# Patient Record
(demographics unavailable — no encounter records)

---

## 2025-01-14 NOTE — ASSESSMENT
[FreeTextEntry1] : periods regular with weight loss and exercise  labs nutrition return 3 months if needed

## 2025-01-14 NOTE — HISTORY OF PRESENT ILLNESS
[FreeTextEntry2] : elevated testosterone level Menarche at age 14 years 1 month old initally regular  for the first the first 3 months, length varied, then started skipped 1-2 months, lasted about 5 days, period 5/24, then not agian until 9/24, lasted 5 days, regular since 9/24, lmp 12/20-24 no acne waxing above lip once to twice per week no exposure to androgenic products  weight fluctuated over the past year August 198 pounds, started walking every day  until 1-15,000 steps, lost about 186 pounds, no change in diet.

## 2025-01-14 NOTE — PHYSICAL EXAM
[Healthy Appearing] : healthy appearing [Well Nourished] : well nourished [Interactive] : interactive [Normal Appearance] : normal appearance [Well formed] : well formed [Normally Set] : normally set [Normal S1 and S2] : normal S1 and S2 [Clear to Ausculation Bilaterally] : clear to auscultation bilaterally [Abdomen Soft] : soft [Abdomen Tenderness] : non-tender [] : no hepatosplenomegaly [5] : was Roger stage 5 [Roger Stage ___] : the Roger stage for breast development was [unfilled] [Normal] : normal  [Murmur] : no murmurs

## 2025-01-14 NOTE — PAST MEDICAL HISTORY
[At ___ Weeks Gestation] : at [unfilled] weeks gestation [Normal Vaginal Route] : by normal vaginal route [None] : there were no delivery complications [FreeTextEntry1] : 6.5 pounds, [facility]

## 2025-04-29 NOTE — ASSESSMENT
[FreeTextEntry1] : Beth is a 15 year 10 month old female with a history of elevated testosterone level and irregular periods that have now become regular with weight loss and exercise, likely component of insulin resistance and ovarian hyperandrogenism now improved  labs nutrition return 3 months if needed  cardiology nutiritoin 800 IU plus mvt no need for follow up

## 2025-04-29 NOTE — PHYSICAL EXAM
[Healthy Appearing] : healthy appearing [Well Nourished] : well nourished [Interactive] : interactive [Normal Appearance] : normal appearance [Well formed] : well formed [Normally Set] : normally set [Normal S1 and S2] : normal S1 and S2 [Murmur] : no murmurs [Clear to Ausculation Bilaterally] : clear to auscultation bilaterally [Abdomen Soft] : soft [Abdomen Tenderness] : non-tender [] : no hepatosplenomegaly [5] : was Roger stage 5 [Roger Stage ___] : the Roger stage for breast development was [unfilled] [Normal] : normal

## 2025-04-29 NOTE — REVIEW OF SYSTEMS
[Nl] : Neurological [NI] : Endocrine [Wgt Gain (___ Lbs)] : recent [unfilled] lb weight gain [Irregular Periods] : irregular periods

## 2025-04-29 NOTE — HISTORY OF PRESENT ILLNESS
[Headaches] : no headaches [Visual Symptoms] : no ~T visual symptoms [Polyuria] : no polyuria [Polydipsia] : no polydipsia [Constipation] : no constipation [Cold Intolerance] : no cold intolerance [Sweating] : no sweating [Palpitations] : no palpitations [Nervousness] : no nervousness [Fatigue] : no fatigue [Weakness] : no weakness [Abdominal Pain] : no abdominal pain [FreeTextEntry2] : Beth is a 15 year 10 month old female seen for initial consultation of elevated testosterone level Menarche at age 14 years 1 month old initially regular for the first the first 3 months, length varied, then started skipped 1-2 months, lasted about 5 days, period 5/24, then not agian until 9/24, lasted 5 days, regular since 9/24, lmp 12/20-24 no acne waxing above lip once to twice per week no exposure to androgenic products  weight fluctuated over the past year August 198 pounds, started walking every day  until 1-15,000 steps, lost about 10 pounds, no change in diet.   4/29/25: Well since last visit. Saw nutritionist, cut back on fast food. Still walking 15.000 steps per day. LMP yesterday, this month 3 weeks late, otherwise regular. Presyncopal episode at school, 1st period, ate breakfast. Friend said she looked pale and lips looked purple, occurred a few weeks ago, no subsequent episode.

## 2025-05-21 NOTE — PLAN
[FreeTextEntry1] : Plan - Recommend an MRI to assess potential increased intracranial pressure.  - Advise increasing water intake to help manage symptoms of dizziness and potential orthostatic hypotension. - Schedule an appointment with an ophthalmologist to evaluate for any signs of papilledema or other eye-related issues. - Follow up with a cardiologist to rule out any cardiac causes for the syncopal episodes and ensure cardiovascular health. - Plan a follow-up appointment in three months to reassess symptoms and review any new findings from the MRI and specialist consultations.

## 2025-05-21 NOTE — PHYSICAL EXAM
[Well-appearing] : well-appearing [Normocephalic] : normocephalic [No dysmorphic facial features] : no dysmorphic facial features [No ocular abnormalities] : no ocular abnormalities [Neck supple] : neck supple [Alert] : alert [Well related, good eye contact] : well related, good eye contact [Conversant] : conversant [Normal speech and language] : normal speech and language [Follows instructions well] : follows instructions well [VFF] : VFF [Pupils reactive to light and accommodation] : pupils reactive to light and accommodation [Full extraocular movements] : full extraocular movements [No nystagmus] : no nystagmus [Normal facial sensation to light touch] : normal facial sensation to light touch [No facial asymmetry or weakness] : no facial asymmetry or weakness [Gross hearing intact] : gross hearing intact [Equal palate elevation] : equal palate elevation [Good shoulder shrug] : good shoulder shrug [Normal tongue movement] : normal tongue movement [Midline tongue, no fasciculations] : midline tongue, no fasciculations [R handed] : R handed [Normal axial and appendicular muscle tone] : normal axial and appendicular muscle tone [Gets up on table without difficulty] : gets up on table without difficulty [No pronator drift] : no pronator drift [Normal finger tapping and fine finger movements] : normal finger tapping and fine finger movements [No abnormal involuntary movements] : no abnormal involuntary movements [5/5 strength in proximal and distal muscles of arms and legs] : 5/5 strength in proximal and distal muscles of arms and legs [Walks and runs well] : walks and runs well [Able to do deep knee bend] : able to do deep knee bend [Able to walk on heels] : able to walk on heels [Able to walk on toes] : able to walk on toes [2+ biceps] : 2+ biceps [Triceps] : triceps [Knee jerks] : knee jerks [Ankle jerks] : ankle jerks [No ankle clonus] : no ankle clonus [Bilaterally] : bilaterally [Localizes LT and temperature] : localizes LT and temperature [No dysmetria on FTNT] : no dysmetria on FTNT [Good walking balance] : good walking balance [Normal gait] : normal gait [Able to tandem well] : able to tandem well [Negative Romberg] : negative Romberg [de-identified] : Able to visualize fundi on the right no papilledema, unable to visualize left.

## 2025-05-21 NOTE — ASSESSMENT
[FreeTextEntry1] : 15 yo female with presyncopal episodes  Assessment - Vasovagal syncope likely related to changes in blood pressure. - No evidence of seizures. - Not consistent with migraines. - Potential concern for increased intracranial pressure due to high BMI, though not confirmed.

## 2025-05-21 NOTE — CONSULT LETTER
[Dear  ___] : Dear  [unfilled], [Consult Letter:] : I had the pleasure of evaluating your patient, [unfilled]. [Please see my note below.] : Please see my note below. [Consult Closing:] : Thank you very much for allowing me to participate in the care of this patient.  If you have any questions, please do not hesitate to contact me. [Sincerely,] : Sincerely, [FreeTextEntry3] : Dana Parikh MD Medical Director, Pediatric Concussion Program  , [employer] [facility] Department of Pediatric Neurology [facility]  [address], [address] Tel: [phone] Fax: [phone]

## 2025-05-21 NOTE — HISTORY OF PRESENT ILLNESS
[FreeTextEntry1] : Reason for Visit: Headaches and syncopal episodes.   History of present illness Beth Lozoya, 16 years old   Syncopal Episodes and Headaches   Beth has experienced four episodes where she felt like she was about to faint, and her vision went black. The first episode occurred during a health class on a Friday, where she was sitting down and felt her heart racing, her body shaking, and her vision going black. Her friend noticed she was pale with purple lips and helped her by having her put her head down for about 10 minutes, after which she felt better, although she remained dizzy for most of the day. The second episode occurred while she was in a science class, where she experienced similar symptoms, but the episode was shorter. The third episode happened while shopping at [employer] with her mother. She felt her heart racing while standing in line, and her vision started to go black. She drank a soda, which did not immediately help, and her brother took her to the car to lie down, which eventually made her feel better. The fourth episode occurred after eating lunch, where she felt dizzy, her vision went black, and her friend noticed she was turning pale. In none of these episodes did she fall to the ground, bite her tongue, experience incontinence, or foam at the mouth. She sometimes feels dizzy when standing up too quickly, which lasts about 10 minutes, and occasionally experiences her heart racing, accompanied by shaking hands. She sometimes experiences headaches when dizzy, but not consistently when her vision goes black. Lights and noises sometimes bother her during these episodes.   Lifestyle and Habits   Beth eats breakfast, lunch, and dinner every day, although she mentioned that during the first episode, she had not eaten lunch. She drinks a minimum of eight glasses of water daily, and her water bottle is described as "probably big." Beth reports sleeping well.   Asthma   Beth has a history of asthma. During her first syncopal episode, she initially thought her symptoms were related to her asthma, as she felt her heart racing and her body felt heavy. She sometimes experiences her heart racing, which causes her hands to shake. Her asthma is well-controlled.   Irregular Menstruation   Beth has a history of irregular menstruation. No further details were provided regarding this condition.   Obesity   Beth has a BMI above the ninety-fifth percentile, indicating obesity. She has been seen by endocrinology and nutrition in the past for evaluation and management of her weight.   Elizabeth Campos has a history of asthma and obesity. She has been seen by endocrinology and nutrition in the past. She sometimes experiences headaches when she feels dizzy, but not consistently when her vision goes black. She reports that lights and noises can be bothersome during these episodes.   Past medical history - Asthma - Irregular menstruation - Obesity (BMI above the 95th percentile)   Family history - Family history of cysts in the head - Family history of hypertension   Social history - Does not smoke - Does not drink alcohol - Beth is doing well in school.   Current medications - Motrin, as needed - Tylenol, as needed - Multivitamin, unspecified dosage

## 2025-06-27 NOTE — REVIEW OF SYSTEMS
[Dizziness] : dizziness [Feeling Poorly] : not feeling poorly (malaise) [Fever] : no fever [Wgt Loss (___ Lbs)] : no recent weight loss [Pallor] : not pale [Eye Discharge] : no eye discharge [Redness] : no redness [Change in Vision] : no change in vision [Nasal Stuffiness] : no nasal congestion [Sore Throat] : no sore throat [Earache] : no earache [Loss Of Hearing] : no hearing loss [Cyanosis] : no cyanosis [Edema] : no edema [Diaphoresis] : not diaphoretic [Chest Pain] : no chest pain or discomfort [Exercise Intolerance] : no persistence of exercise intolerance [Palpitations] : no palpitations [Orthopnea] : no orthopnea [Fast HR] : no tachycardia [Tachypnea] : not tachypneic [Wheezing] : no wheezing [Cough] : no cough [Shortness Of Breath] : not expressed as feeling short of breath [Vomiting] : no vomiting [Diarrhea] : no diarrhea [Abdominal Pain] : no abdominal pain [Decrease In Appetite] : appetite not decreased [Fainting (Syncope)] : no fainting [Seizure] : no seizures [Headache] : no headache [Limping] : no limping [Joint Pains] : no arthralgias [Joint Swelling] : no joint swelling [Rash] : no rash [Wound problems] : no wound problems [Easy Bruising] : no tendency for easy bruising [Swollen Glands] : no lymphadenopathy [Nosebleeds] : no epistaxis [Easy Bleeding] : no ~M tendency for easy bleeding [Sleep Disturbances] : ~T no sleep disturbances [Hyperactive] : no hyperactive behavior [Depression] : no depression [Anxiety] : no anxiety [Failure To Thrive] : no failure to thrive [Short Stature] : short stature was not noted [Jitteriness] : no jitteriness [Heat/Cold Intolerance] : no temperature intolerance [Dec Urine Output] : no oliguria

## 2025-06-27 NOTE — CARDIOLOGY SUMMARY
[Today's Date] : [unfilled] [FreeTextEntry1] : For Near syncope Normal sinus rhythm, normal QRS axis, normal intervals (QTc 448msec), no hypertrophy, no pre-excitation, no ST segment or T wave abnormalities. Normal EKG for age.   [FreeTextEntry2] : Normal intracardiac anatomy.  LV dimensions and shortening fraction were normal.  No pericardial effusion.  [de-identified] : placed

## 2025-06-27 NOTE — HISTORY OF PRESENT ILLNESS
[FreeTextEntry1] : LISSETT is a 16-year-old female who was referred for cardiology consultation due to near syncope. There have been many near syncopal episodes since April 2025. This episode occurred when she was standing.  She had not been exercising around the time of the event. She had eaten breakfast that day and was reportedly well-hydrated. She has occasional orthostatic dizziness. She denies chest pain, palpitations, shortness of breath, diaphoresis, or nausea. There has been no recent change in activity level, no fatigue, and no difficulty gaining weight or weight loss. She is active in treadmill 6 days for total 2 hrs./day and has had no recent decrease in exercise endurance. She generally has fair fluid intake and does not drink excessive caffeinated beverages. Importantly, there is no family history of recurrent syncope, premature sudden death, cardiomyopathy, arrhythmia, drowning, or unexplained accidental deaths.

## 2025-06-27 NOTE — PHYSICAL EXAM
[General Appearance - Alert] : alert [General Appearance - In No Acute Distress] : in no acute distress [General Appearance - Well Nourished] : well nourished [General Appearance - Well Developed] : well developed [General Appearance - Well-Appearing] : well appearing [Appearance Of Head] : the head was normocephalic [Facies] : there were no dysmorphic facial features [Sclera] : the conjunctiva were normal [Outer Ear] : the ears and nose were normal in appearance [Examination Of The Oral Cavity] : mucous membranes were moist and pink [Auscultation Breath Sounds / Voice Sounds] : breath sounds clear to auscultation bilaterally [Normal Chest Appearance] : the chest was normal in appearance [Apical Impulse] : quiet precordium with normal apical impulse [Heart Rate And Rhythm] : normal heart rate and rhythm [Heart Sounds] : normal S1 and S2 [No Murmur] : no murmurs  [Heart Sounds Gallop] : no gallops [Heart Sounds Pericardial Friction Rub] : no pericardial rub [Edema] : no edema [Arterial Pulses] : normal upper and lower extremity pulses with no pulse delay [Capillary Refill Test] : normal capillary refill [Heart Sounds Click] : no clicks [Bowel Sounds] : normal bowel sounds [Abdomen Soft] : soft [Nondistended] : nondistended [Abdomen Tenderness] : non-tender [Nail Clubbing] : no clubbing  or cyanosis of the fingers [Motor Tone] : normal muscle strength and tone [Cervical Lymph Nodes Enlarged Anterior] : The anterior cervical nodes were normal [Cervical Lymph Nodes Enlarged Posterior] : The posterior cervical nodes were normal [] : no rash [Skin Lesions] : no lesions [Skin Turgor] : normal turgor [Demonstrated Behavior - Infant Nonreactive To Parents] : interactive [Mood] : mood and affect were appropriate for age [Demonstrated Behavior] : normal behavior

## 2025-06-27 NOTE — DISCUSSION/SUMMARY
[FreeTextEntry1] : In summary, LISSETT is a 16 year female with multiple syncope. LISSETT had one syncopal episode which was likely vasovagal in origin. It was provoked by upright posture and inadequate fluid intake. I discussed at length with the family that these symptoms are not likely related to cardiac pathology.   We discussed the need to maintain adequate hydration, drinking at least 8-10 cups of water per day, and avoiding caffeinated beverages. Her fluid intake should be titrated to keep his urine dilute. We discussed eating an adequate, healthy breakfast in the morning, and lunch at school. We discussed standing up slowly from a lying or seated position to avoid these episodes, as well as recognizing the warning signs (lightheadedness, nausea, visual change) and lying down with elevated legs when they occur. If necessary, increasing salt intake may also help alleviate these symptoms. I believe these interventions will reduce, if not completely eliminate further episodes.   Routine pediatric cardiology follow-up is not indicated unless the Holter monitor is abnormal, if there are episodes of recurrent syncope, chest pain, palpitations or there are any other cardiac concerns. The family verbalized understanding, and all questions were answered.

## 2025-06-27 NOTE — CONSULT LETTER
[Today's Date] : [unfilled] [Name] : Name: [unfilled] [] : : ~~ [Today's Date:] : [unfilled] [Dear  ___:] : Dear Dr. [unfilled]: [Consult] : I had the pleasure of evaluating your patient, [unfilled]. My full evaluation follows. [Consult - Single Provider] : Thank you very much for allowing me to participate in the care of this patient. If you have any questions, please do not hesitate to contact me. [Sincerely,] : Sincerely, [FreeTextEntry4] : Romain Teran MD [FreeTextEntry5] : [address] [FreeTextEntry6] : [place] [address] [de-identified] : Jarred Rajan MD, FACC, [place], FAAP Pediatric Cardiologist [facility]